# Patient Record
Sex: FEMALE | Race: WHITE | Employment: FULL TIME | URBAN - METROPOLITAN AREA
[De-identification: names, ages, dates, MRNs, and addresses within clinical notes are randomized per-mention and may not be internally consistent; named-entity substitution may affect disease eponyms.]

---

## 2024-09-28 ENCOUNTER — HOSPITAL ENCOUNTER (OUTPATIENT)
Age: 24
Discharge: HOME | End: 2024-09-28
Payer: COMMERCIAL

## 2024-09-28 ENCOUNTER — APPOINTMENT (OUTPATIENT)
Dept: ULTRASOUND IMAGING | Facility: HOSPITAL | Age: 24
End: 2024-09-28
Attending: NURSE PRACTITIONER
Payer: COMMERCIAL

## 2024-09-28 VITALS
DIASTOLIC BLOOD PRESSURE: 72 MMHG | SYSTOLIC BLOOD PRESSURE: 143 MMHG | TEMPERATURE: 98 F | RESPIRATION RATE: 20 BRPM | HEART RATE: 98 BPM | OXYGEN SATURATION: 100 %

## 2024-09-28 DIAGNOSIS — R10.2 PELVIC PAIN: Primary | ICD-10-CM

## 2024-09-28 LAB
#MXD IC: 0.4 X10ˆ3/UL (ref 0.1–1)
B-HCG UR QL: NEGATIVE
BILIRUB UR QL STRIP: NEGATIVE
BUN BLD-MCNC: 11 MG/DL (ref 7–18)
CHLORIDE BLD-SCNC: 105 MMOL/L (ref 98–112)
CO2 BLD-SCNC: 22 MMOL/L (ref 21–32)
CREAT BLD-MCNC: 0.7 MG/DL
EGFRCR SERPLBLD CKD-EPI 2021: 125 ML/MIN/1.73M2 (ref 60–?)
GLUCOSE BLD-MCNC: 97 MG/DL (ref 70–99)
GLUCOSE UR STRIP-MCNC: NEGATIVE MG/DL
HCT VFR BLD AUTO: 40.9 %
HCT VFR BLD CALC: 44 %
HGB BLD-MCNC: 13.6 G/DL
ISTAT IONIZED CALCIUM FOR CHEM 8: 1.18 MMOL/L (ref 1.12–1.32)
KETONES UR STRIP-MCNC: NEGATIVE MG/DL
LEUKOCYTE ESTERASE UR QL STRIP: NEGATIVE
LYMPHOCYTES # BLD AUTO: 2.3 X10ˆ3/UL (ref 1–4)
LYMPHOCYTES NFR BLD AUTO: 37.6 %
MCH RBC QN AUTO: 28.6 PG (ref 26–34)
MCHC RBC AUTO-ENTMCNC: 33.3 G/DL (ref 31–37)
MCV RBC AUTO: 86.1 FL (ref 80–100)
MIXED CELL %: 7.3 %
NEUTROPHILS # BLD AUTO: 3.4 X10ˆ3/UL (ref 1.5–7.7)
NEUTROPHILS NFR BLD AUTO: 55.1 %
NITRITE UR QL STRIP: NEGATIVE
PH UR STRIP: 6 [PH]
PLATELET # BLD AUTO: 229 X10ˆ3/UL (ref 150–450)
POTASSIUM BLD-SCNC: 3.7 MMOL/L (ref 3.6–5.1)
PROT UR STRIP-MCNC: NEGATIVE MG/DL
RBC # BLD AUTO: 4.75 X10ˆ6/UL
SODIUM BLD-SCNC: 138 MMOL/L (ref 136–145)
SP GR UR STRIP: >=1.03
UROBILINOGEN UR STRIP-ACNC: <2 MG/DL
WBC # BLD AUTO: 6.1 X10ˆ3/UL (ref 4–11)

## 2024-09-28 PROCEDURE — 76830 TRANSVAGINAL US NON-OB: CPT | Performed by: NURSE PRACTITIONER

## 2024-09-28 PROCEDURE — 81002 URINALYSIS NONAUTO W/O SCOPE: CPT | Performed by: NURSE PRACTITIONER

## 2024-09-28 PROCEDURE — 85025 COMPLETE CBC W/AUTO DIFF WBC: CPT | Performed by: NURSE PRACTITIONER

## 2024-09-28 PROCEDURE — 76856 US EXAM PELVIC COMPLETE: CPT | Performed by: NURSE PRACTITIONER

## 2024-09-28 PROCEDURE — 80047 BASIC METABLC PNL IONIZED CA: CPT | Performed by: NURSE PRACTITIONER

## 2024-09-28 PROCEDURE — 99203 OFFICE O/P NEW LOW 30 MIN: CPT | Performed by: NURSE PRACTITIONER

## 2024-09-28 PROCEDURE — 93975 VASCULAR STUDY: CPT | Performed by: NURSE PRACTITIONER

## 2024-09-28 PROCEDURE — 81025 URINE PREGNANCY TEST: CPT | Performed by: NURSE PRACTITIONER

## 2024-09-28 NOTE — ED INITIAL ASSESSMENT (HPI)
Menstrual like cramps for 4 days now, and her cycle ended 1 week and 3 days ago. She said was worse when she moves around that she feels the discomfort most. Denies uti symptoms.

## 2024-09-28 NOTE — DISCHARGE INSTRUCTIONS
Please take Motrin for pain. We have sent out vaginal cultures and will call you with the results.  Any worsening pain, fever, vomiting or any other concerns please medially go to the emergency department.

## 2024-09-28 NOTE — ED PROVIDER NOTES
Patient Seen in: [facility]      History     Chief Complaint   Patient presents with    Abdominal Pain     Discomfort in my abdomen - Entered by patient     Stated Complaint: Abdominal Pain - Discomfort in my abdomen  Subjective:   HPI    This is a well-appearing 24-year-old female with no significant past medical history who presents with right-sided suprapubic tenderness over the course of the last 4 days.  No urinary symptoms.  Denies any hematuria.  No vaginal discharge.  States it feels like menstrual cramps but only to one side.  She did have her menses 10 days ago.  Is sexually active, with same partner for the last 5 years and is not concerned for STDs.  She has had no fever or chills.  No nausea or vomiting.  No back pain.    Objective:   History reviewed. No pertinent past medical history.         History reviewed. No pertinent surgical history.           Social History     Socioeconomic History    Marital status: Single   Tobacco Use    Smoking status: Never     Passive exposure: Never    Smokeless tobacco: Never   Vaping Use    Vaping status: Never Used   Substance and Sexual Activity    Alcohol use: Yes    Drug use: Never            Review of Systems   All other systems reviewed and are negative.      Positive for stated complaint: Abdominal Pain (Discomfort in my abdomen - Entered by patient)    Other systems are as noted in HPI.  Constitutional and vital signs reviewed.      All other systems reviewed and negative except as noted above.    Physical Exam     ED Triage Vitals [09/28/24 0824]   /72   Pulse 98   Resp 20   Temp 98.1 °F (36.7 °C)   Temp src Temporal   SpO2 100 %   O2 Device None (Room air)     Current:/72   Pulse 98   Temp 98.1 °F (36.7 °C) (Temporal)   Resp 20   LMP 09/14/2024 (Exact Date)   SpO2 100%     Physical Exam  Vitals and nursing note reviewed. Exam conducted with a chaperone present.   Constitutional:       General: She is awake. She is not in acute  distress.     Appearance: Normal appearance. She is not ill-appearing, toxic-appearing or diaphoretic.   HENT:      Head: Normocephalic and atraumatic.      Right Ear: Tympanic membrane, ear canal and external ear normal.      Left Ear: Tympanic membrane, ear canal and external ear normal.      Nose: Nose normal.      Mouth/Throat:      Mouth: Mucous membranes are moist.      Pharynx: Oropharynx is clear. Uvula midline.   Eyes:      General: Lids are normal.      Extraocular Movements: Extraocular movements intact.      Conjunctiva/sclera: Conjunctivae normal.      Pupils: Pupils are equal, round, and reactive to light.   Cardiovascular:      Rate and Rhythm: Normal rate and regular rhythm.      Pulses: Normal pulses.      Heart sounds: Normal heart sounds.   Pulmonary:      Effort: Pulmonary effort is normal.      Breath sounds: Normal breath sounds and air entry. No stridor, decreased air movement or transmitted upper airway sounds.   Abdominal:      Tenderness: There is abdominal tenderness in the suprapubic area. Negative signs include McBurney's sign.      Comments: Mild right superpubic tenderness   Genitourinary:     Comments: Clear vaginal discharge. No CMT   Skin:     General: Skin is warm and dry.      Capillary Refill: Capillary refill takes less than 2 seconds.   Neurological:      General: No focal deficit present.      Mental Status: She is alert and oriented to person, place, and time.   Psychiatric:         Mood and Affect: Mood normal.         Behavior: Behavior normal. Behavior is cooperative.         Thought Content: Thought content normal.         Judgment: Judgment normal.       ED Course   CBC, Chem-8 reviewed, unremarkable.  No leukocytosis.  Urine reviewed, trace blood, otherwise unremarkable.  Cloudy colored urine.  Pregnancy negative.  Patient agrees with going to [facility] for outpatient ultrasound at this time.  2 cm simple left ovarian cystic lesion which may reflect a dominant  follicle or benign cyst.  Otherwise unremarkable sonographic appearance of the uterus.  US PELVIS EV W DOPPLER (CPT=76856/20614/68239)    Result Date: 9/28/2024  CONCLUSION:    1. A 2.0 cm simple left ovarian cystic lesion, which may reflect a dominant follicle or benign cyst. 2. Trace free fluid in the cul-de-sac, which is likely physiologic. 3. Normal endometrial thickness of 8 mm. 4. Unremarkable sonographic appearance of the uterus.    Dictated by (CST): Irving Kaiser MD on 9/28/2024 at 11:08 AM     Finalized by (CST): Irving Kaiser MD on 9/28/2024 at 11:11 AM          US PELVIS EV W DOPPLER (CPT=76856/24723/56929)    Result Date: 9/28/2024  CONCLUSION:    1. A 2.0 cm simple left ovarian cystic lesion, which may reflect a dominant follicle or benign cyst. 2. Trace free fluid in the cul-de-sac, which is likely physiologic. 3. Normal endometrial thickness of 8 mm. 4. Unremarkable sonographic appearance of the uterus.    Dictated by (CST): Irving Kaiser MD on 9/28/2024 at 11:08 AM     Finalized by (CST): Irving Kaiser MD on 9/28/2024 at 11:11 AM         Labs Reviewed   [facility] POCT URINALYSIS DIPSTICK - Abnormal; Notable for the following components:       Result Value    Urine Clarity Cloudy (*)     Blood, Urine Trace-Intact (*)     All other components within normal limits   POCT PREGNANCY URINE - Normal   POCT ISTAT CHEM8 CARTRIDGE - Normal   POCT CBC   CHLAMYDIA/GONOCOCCUS, CAROL   VAGINITIS VAGINOSIS PCR PANEL       MDM     Medical Decision Making  Differential diagnoses reflecting the complexity of care include but are not limited to cystitis, ovarian cyst, ovarian torsion, PID, STI, BV.    Comorbidities that add complexity to management include: N/A  History obtained by an independent source was from: N/A  Discussions of management was done with: Dr. King   My independent interpretations of studies include: CBC, Chem-8, urinalysis, urine pregnancy, ultrasound  Shared decision making was done by: Patient  and myself  Patient is well appearing, non-toxic and in no acute distress.  Vital signs are stable.  I discussed with the patient the need for close follow-up with primary care provider.  Do not believe she needs any other imaging at this time, less likely appendicitis.  Negative McBurney's point.  I did discuss with the patient close follow-up with the primary care provider as recommended.  May take Motrin or Tylenol as needed for pain.  Any worsening pain, vomiting, fever or any other concerns please immediately go to the emergency department.  Patient verbalized plan of care and states understanding.    Problems Addressed:  Pelvic pain: acute illness or injury    Amount and/or Complexity of Data Reviewed  Labs: ordered. Decision-making details documented in ED Course.  Radiology: ordered and independent interpretation performed. Decision-making details documented in ED Course.  ECG/medicine tests: ordered and independent interpretation performed. Decision-making details documented in ED Course.    Risk  OTC drugs.        Disposition and Plan     Clinical Impression:  1. Pelvic pain         Disposition:  Discharge  9/28/2024 11:18 am    Follow-up:  Primary Care Provider                Medications Prescribed:  There are no discharge medications for this patient.         Note to patient: The [employer] act makes medical notes like these available to patients in the interest of transparency.  However, be advised this medical document and is intended as peer to peer communication.  It is read the medical language and may contain abbreviations or verbiage that are unfamiliar.  It may appear blunt or direct.  Medical documents are intended to carry relevant information, fax is evident and the clinical opinion of the practitioner.    This note was prepared using Dragon Medical voice recognition dictation software.  As a result, errors may occur.  When identified, these errors have been corrected.  While every  attempt is made to correct errors during dictation, discrepancies may still exist.    Odalis Martínez, DONTE  9/28/2024  8:58 AM

## 2024-09-29 LAB
BV BACTERIA DNA VAG QL NAA+PROBE: NEGATIVE
C GLABRATA DNA VAG QL NAA+PROBE: NEGATIVE
C KRUSEI DNA VAG QL NAA+PROBE: NEGATIVE
C TRACH DNA SPEC QL NAA+PROBE: NEGATIVE
CANDIDA DNA VAG QL NAA+PROBE: NEGATIVE
N GONORRHOEA DNA SPEC QL NAA+PROBE: NEGATIVE
T VAGINALIS DNA VAG QL NAA+PROBE: NEGATIVE